# Patient Record
Sex: MALE | Race: OTHER | HISPANIC OR LATINO | URBAN - METROPOLITAN AREA
[De-identification: names, ages, dates, MRNs, and addresses within clinical notes are randomized per-mention and may not be internally consistent; named-entity substitution may affect disease eponyms.]

---

## 2023-10-03 ENCOUNTER — EMERGENCY (EMERGENCY)
Facility: HOSPITAL | Age: 38
LOS: 1 days | Discharge: HOME | End: 2023-10-03
Attending: EMERGENCY MEDICINE
Payer: MEDICAID

## 2023-10-03 VITALS
OXYGEN SATURATION: 98 % | SYSTOLIC BLOOD PRESSURE: 137 MMHG | TEMPERATURE: 98 F | RESPIRATION RATE: 16 BRPM | HEART RATE: 97 BPM | DIASTOLIC BLOOD PRESSURE: 96 MMHG | WEIGHT: 194.89 LBS | HEIGHT: 66 IN

## 2023-10-03 VITALS
DIASTOLIC BLOOD PRESSURE: 83 MMHG | OXYGEN SATURATION: 99 % | SYSTOLIC BLOOD PRESSURE: 129 MMHG | HEART RATE: 64 BPM | TEMPERATURE: 97 F | RESPIRATION RATE: 16 BRPM

## 2023-10-03 LAB
ALBUMIN SERPL ELPH-MCNC: 4 G/DL — SIGNIFICANT CHANGE UP (ref 3.5–5)
ALP SERPL-CCNC: 89 U/L — SIGNIFICANT CHANGE UP (ref 40–120)
ALT FLD-CCNC: 65 U/L DA — HIGH (ref 10–60)
ANION GAP SERPL CALC-SCNC: 6 MMOL/L — SIGNIFICANT CHANGE UP (ref 5–17)
AST SERPL-CCNC: 15 U/L — SIGNIFICANT CHANGE UP (ref 10–40)
BASOPHILS # BLD AUTO: 0.06 K/UL — SIGNIFICANT CHANGE UP (ref 0–0.2)
BASOPHILS NFR BLD AUTO: 0.7 % — SIGNIFICANT CHANGE UP (ref 0–2)
BILIRUB SERPL-MCNC: 0.2 MG/DL — SIGNIFICANT CHANGE UP (ref 0.2–1.2)
BUN SERPL-MCNC: 13 MG/DL — SIGNIFICANT CHANGE UP (ref 7–18)
CALCIUM SERPL-MCNC: 8.7 MG/DL — SIGNIFICANT CHANGE UP (ref 8.4–10.5)
CHLORIDE SERPL-SCNC: 108 MMOL/L — SIGNIFICANT CHANGE UP (ref 96–108)
CO2 SERPL-SCNC: 25 MMOL/L — SIGNIFICANT CHANGE UP (ref 22–31)
CREAT SERPL-MCNC: 0.95 MG/DL — SIGNIFICANT CHANGE UP (ref 0.5–1.3)
D DIMER BLD IA.RAPID-MCNC: 154 NG/ML DDU — SIGNIFICANT CHANGE UP
EGFR: 106 ML/MIN/1.73M2 — SIGNIFICANT CHANGE UP
EOSINOPHIL # BLD AUTO: 0.49 K/UL — SIGNIFICANT CHANGE UP (ref 0–0.5)
EOSINOPHIL NFR BLD AUTO: 5.6 % — SIGNIFICANT CHANGE UP (ref 0–6)
GLUCOSE SERPL-MCNC: 108 MG/DL — HIGH (ref 70–99)
HCT VFR BLD CALC: 43.7 % — SIGNIFICANT CHANGE UP (ref 39–50)
HGB BLD-MCNC: 14.7 G/DL — SIGNIFICANT CHANGE UP (ref 13–17)
HIV 1 & 2 AB SERPL IA.RAPID: SIGNIFICANT CHANGE UP
IMM GRANULOCYTES NFR BLD AUTO: 0.5 % — SIGNIFICANT CHANGE UP (ref 0–0.9)
LYMPHOCYTES # BLD AUTO: 3.32 K/UL — HIGH (ref 1–3.3)
LYMPHOCYTES # BLD AUTO: 37.6 % — SIGNIFICANT CHANGE UP (ref 13–44)
MCHC RBC-ENTMCNC: 28.9 PG — SIGNIFICANT CHANGE UP (ref 27–34)
MCHC RBC-ENTMCNC: 33.6 GM/DL — SIGNIFICANT CHANGE UP (ref 32–36)
MCV RBC AUTO: 85.9 FL — SIGNIFICANT CHANGE UP (ref 80–100)
MONOCYTES # BLD AUTO: 0.68 K/UL — SIGNIFICANT CHANGE UP (ref 0–0.9)
MONOCYTES NFR BLD AUTO: 7.7 % — SIGNIFICANT CHANGE UP (ref 2–14)
NEUTROPHILS # BLD AUTO: 4.23 K/UL — SIGNIFICANT CHANGE UP (ref 1.8–7.4)
NEUTROPHILS NFR BLD AUTO: 47.9 % — SIGNIFICANT CHANGE UP (ref 43–77)
NRBC # BLD: 0 /100 WBCS — SIGNIFICANT CHANGE UP (ref 0–0)
PLATELET # BLD AUTO: 280 K/UL — SIGNIFICANT CHANGE UP (ref 150–400)
POTASSIUM SERPL-MCNC: 3.8 MMOL/L — SIGNIFICANT CHANGE UP (ref 3.5–5.3)
POTASSIUM SERPL-SCNC: 3.8 MMOL/L — SIGNIFICANT CHANGE UP (ref 3.5–5.3)
PROT SERPL-MCNC: 7.7 G/DL — SIGNIFICANT CHANGE UP (ref 6–8.3)
RBC # BLD: 5.09 M/UL — SIGNIFICANT CHANGE UP (ref 4.2–5.8)
RBC # FLD: 12 % — SIGNIFICANT CHANGE UP (ref 10.3–14.5)
SODIUM SERPL-SCNC: 139 MMOL/L — SIGNIFICANT CHANGE UP (ref 135–145)
TROPONIN I, HIGH SENSITIVITY RESULT: 3.4 NG/L — SIGNIFICANT CHANGE UP
WBC # BLD: 8.82 K/UL — SIGNIFICANT CHANGE UP (ref 3.8–10.5)
WBC # FLD AUTO: 8.82 K/UL — SIGNIFICANT CHANGE UP (ref 3.8–10.5)

## 2023-10-03 PROCEDURE — 93005 ELECTROCARDIOGRAM TRACING: CPT

## 2023-10-03 PROCEDURE — 86703 HIV-1/HIV-2 1 RESULT ANTBDY: CPT

## 2023-10-03 PROCEDURE — 84484 ASSAY OF TROPONIN QUANT: CPT

## 2023-10-03 PROCEDURE — 71046 X-RAY EXAM CHEST 2 VIEWS: CPT

## 2023-10-03 PROCEDURE — 85025 COMPLETE CBC W/AUTO DIFF WBC: CPT

## 2023-10-03 PROCEDURE — 96374 THER/PROPH/DIAG INJ IV PUSH: CPT

## 2023-10-03 PROCEDURE — 71046 X-RAY EXAM CHEST 2 VIEWS: CPT | Mod: 26

## 2023-10-03 PROCEDURE — 99285 EMERGENCY DEPT VISIT HI MDM: CPT | Mod: 25

## 2023-10-03 PROCEDURE — 99285 EMERGENCY DEPT VISIT HI MDM: CPT

## 2023-10-03 PROCEDURE — 36415 COLL VENOUS BLD VENIPUNCTURE: CPT

## 2023-10-03 PROCEDURE — 85379 FIBRIN DEGRADATION QUANT: CPT

## 2023-10-03 PROCEDURE — 80053 COMPREHEN METABOLIC PANEL: CPT

## 2023-10-03 RX ORDER — KETOROLAC TROMETHAMINE 30 MG/ML
15 SYRINGE (ML) INJECTION ONCE
Refills: 0 | Status: DISCONTINUED | OUTPATIENT
Start: 2023-10-03 | End: 2023-10-03

## 2023-10-03 RX ADMIN — Medication 15 MILLIGRAM(S): at 06:11

## 2023-10-03 RX ADMIN — Medication 15 MILLIGRAM(S): at 05:23

## 2023-10-03 NOTE — ED PROVIDER NOTE - NSFOLLOWUPINSTRUCTIONS_ED_ALL_ED_FT
Para el dolor tome tylenol 650 mg o ibuprofeno 600 mg cada 6 horas.  Seguimiento con PMD para reevaluación.  Regrese al servicio de urgencias si eddy síntomas empeoran: fiebre toribio, dolor de pecho intenso o dificultad para respirar.      For pain take tylenol 650mg or ibuprofen 600mg every 6 hours.  Followup with PMD for reevaluation.  Return to ED if you develop worsening symptoms-high fevers, severe chest pain or difficulty breathing.

## 2023-10-03 NOTE — ED ADULT NURSE NOTE - NSFALLUNIVINTERV_ED_ALL_ED
Bed/Stretcher in lowest position, wheels locked, appropriate side rails in place/Call bell, personal items and telephone in reach/Instruct patient to call for assistance before getting out of bed/chair/stretcher/Non-slip footwear applied when patient is off stretcher/[place] to call system/Physically safe environment - no spills, clutter or unnecessary equipment/Purposeful proactive rounding/Room/bathroom lighting operational, light cord in reach

## 2023-10-03 NOTE — ED PROVIDER NOTE - PATIENT PORTAL LINK FT
You can access the FollowMyHealth Patient Portal offered by [facility] by registering at the following website: [email or web address]. By joining [facility]’s FollowMyHealth portal, you will also be able to view your health information using other applications (apps) compatible with our system.

## 2023-10-03 NOTE — ED PROVIDER NOTE - CLINICAL SUMMARY MEDICAL DECISION MAKING FREE TEXT BOX
38-year-old male with no past medical history presents with 3 days of chest pain.   ekg interpretation- NSR  lab interpretation- wbc wnl, trop wnl, ddimer wnl  imaging interpretation- CXR shows no focal infiltrate  Discussed above with patient/family. On reeval patient well-appearing, stable for discharge.

## 2023-10-03 NOTE — ED PROVIDER NOTE - OBJECTIVE STATEMENT
38-year-old male with no past medical history presents with 3 days of chest pain.  Reports onset 2 days ago over the right side of his chest under his armpit.  Reports pain is exacerbated by deep inspiration movement and palpation to the area.  Reports he took aspirin 1 day ago with mild improvement of the pain.  Denies any fevers, cough, shortness of breath, leg swelling/pain, recent travel or recent surgeries.  Denies similar symptoms in the past.  Reports occasional alcohol use, is a former smoker and denies any drug/cocaine use.  Evaluation performed in Congolese language by me.

## 2024-04-19 ENCOUNTER — EMERGENCY (EMERGENCY)
Facility: HOSPITAL | Age: 39
LOS: 1 days | Discharge: HOME | End: 2024-04-19
Attending: STUDENT IN AN ORGANIZED HEALTH CARE EDUCATION/TRAINING PROGRAM
Payer: COMMERCIAL

## 2024-04-19 VITALS
WEIGHT: 181.88 LBS | SYSTOLIC BLOOD PRESSURE: 117 MMHG | HEART RATE: 68 BPM | HEIGHT: 66.54 IN | RESPIRATION RATE: 18 BRPM | DIASTOLIC BLOOD PRESSURE: 85 MMHG | OXYGEN SATURATION: 98 % | TEMPERATURE: 98 F

## 2024-04-19 PROCEDURE — 99283 EMERGENCY DEPT VISIT LOW MDM: CPT

## 2024-04-19 PROCEDURE — 99282 EMERGENCY DEPT VISIT SF MDM: CPT

## 2024-04-19 NOTE — ED PROVIDER NOTE - PHYSICAL EXAMINATION
Constitutional: Well-appearing, well-nourished, comfortable appearing.   Head: Normocephalic, atraumatic.   Eyes: PERRL. EOMI. No conjunctival pallor.   ENT: Moist mucous membranes. Uvula midline. No pharyngeal erythema or exudates.  Neck: No LAD. Supple.  CVS: Regular rate, regular rhythm. Normal S1, S2. No murmurs, rubs, or gallops. No peripheral edema noted.   Respiratory: No respiratory distress. Clear to auscultation bilaterally. No wheezing, rales, or rhonchi.   Abdomen: Abd is soft and non-distended. No tenderness. No rebound, guarding, or rigidity.   MSK: No CVA tenderness bilaterally.   Neuro: Alert and oriented to person, place, and time. Follows commands. Moves all extremities. Normal finger to nose. Normal gait. CN II-XII intact. Strenght is 5/5 and symmetric bilaterally. Sensation grossly intact.   Skin: Warm and dry. No rashes.   Psych: Normal affect, cooperative.

## 2024-04-19 NOTE — ED PROVIDER NOTE - CLINICAL SUMMARY MEDICAL DECISION MAKING FREE TEXT BOX
38-year-old male with no significant past medical history presenting to the ED for evaluation of elevated blood pressure.  As per patient, he states that he has been experiencing intermittent headaches over the past week and has been treating it with Excedrin at home.  Patient takes he takes 1-2 doses a day with temporary relief.  Patient states that he ended up going to the pharmacy to buy a blood pressure monitor and had his blood pressure taken and was elevated in the 140s and was advised to go to an urgent care for further evaluation.  At the urgent care, the patient was evaluated to the ED for further evaluation.  Patient has no headache at this time.  He denies any chest pain, shortness of breath, cough, fever, chills, nausea, vomiting, diarrhea, abdominal pain, dysuria, hematuria, flank pain, changes in vision, changes in speech, numbness, weakness, and any additional complaints at this time.      In the ED, the patient is well-appearing with no focal neurologic deficits.  Normal gait.  Cranial nerves all intact.  Cerebellar functions intact.  Patient is afebrile, not tachycardic, normotensive, and satting 98% on room air.  I advised the patient to establish primary care and to initiate a blood pressure diary over the next 2 weeks until he is able to see her primary care physician.  He reports increased stress and describes headache in frontal band like distribution, likely tension headache as no light/sound sensitivity, nausea/vomiting. Patient agreeable with plan.  In terms of his headaches, I advised the patient that he can start ibuprofen every 4-6 hours as needed for pain and if continued pain he can take Acetaminophen 650 mg interchangeable every several hours. He agrees with plan. Pt is stable for discharge.

## 2024-04-19 NOTE — ED PROVIDER NOTE - NSFOLLOWUPINSTRUCTIONS_ED_ALL_ED_FT
jenn juan miguel stefan con dupree doctor primario.     Comienze diario para la presion diario hasta que indra el doctor(a) primario.     Vuelva si eddy sintomas empeoran.     Cómo tomarse la presión arterial  How to Take Your Blood Pressure  La presión arterial es la medida de la fuerza de la marjorie al presionar contra las calvo de las arterias. Las arterias son los vasos sanguíneos que transportan la marjorie desde el corazón hacia todas las partes del cuerpo. Dupree médico sammi dupree presión arterial en cada visita al consultorio. Usted también puede tomarse la presión arterial en casa con un tensiómetro.    Es posible que deba tomarse la presión arterial:  Para confirmar un diagnóstico de presión arterial elevada (hipertensión).  Para vigilar dupree presión arterial a lo alysia del tiempo.  Para asegurarse de que el medicamento que sammi para la presión arterial esté surtiendo efecto.  Materiales necesarios:  Monitor para medir la presión arterial.  Juan Miguel silla para sentarse. Debe ser juan miguel silla en la que pueda sentarse erguido con la espalda apoyada. No se siente en un sillón blando o sofá.  [place] o escritorio.  Cuaderno pequeño y lápiz o bolígrafo.  Cómo prepararse  Para obtener la lectura más precisa, evite realizar lo siguiente neo los 30 minutos previos a controlar dupree presión arterial:  Beber cafeína.  Consumir alcohol.  [place].  Fumar.  Realizar actividad física.  Robert minutos antes de controlar dupree presión arterial:  Vaya al baño y orine para tener la vejiga vacía.  Siéntese tranquilamente en juan miguel silla. No hable.  Cómo tomarse la presión arterial  A person checking his blood pressure with a monitor and cuff.  Para controlar dupree presión arterial, siga las instrucciones presentes en el manual que se incluye con el tensiómetro. Si tiene un tensiómetro digital, las instrucciones podrían ser las siguientes:  Siéntese derecho en juan miguel silla.  Coloque los pies en el piso. No cruce los tobillos ni las piernas.  Apoye dupree brazo negra al nivel de dupree corazón en juan miguel conley o escritorio, o en el brazo de la silla.  [place]ng[place].  Envuelva la parte superior de dupree brazo negra, 1 pulgada (2.5 cm) sobre dupree codo, con el brazalete. Es mejor envolver el brazalete alrededor de la piel desnuda.  Ajuste el brazalete ceñidamente, clementina no demasiado apretado, alrededor del brazo. Debe poder meter únicamente un dedo entre el brazalete y el brazo.  Coloque el cordón de modo que quede apoyado en el pliegue del codo.  Presione el botón de encendido.  Permanezca sentado tranquilamente mientras el brazalete se infla y se desinfla.  Negar la lectura digital que aparece en la pantalla del tensiómetro y anote los números (regístrelos) en un cuaderno.  Espere de 2 a 3 minutos, y luego repita los pasos desde el paso 1.  ¿Qué significa mi lectura de presión arterial?  Juan Miguel lectura de la presión arterial consta de un número más alto sobre un número más bajo. En condiciones ideales, la presión arterial debe estar por debajo de 120/80. El primer número (“superior”) es la presión sistólica. Es la medida de la presión de las arterias cuando el corazón late. El geeta número (“inferior”) es la presión diastólica. Es la medida de la presión en las arterias cuando el corazón se relaja.    La presión arterial se clasifica en cuatro etapas. Las siguientes son las etapas para adultos que no tienen enfermedad grave de corto plazo o juan miguel afección crónica. La presión sistólica y la presión diastólica se miden en juan miguel unidad llamada mm Hg (milímetros de bang).    Normal    Presión sistólica: por debajo de 120.  Presión diastólica: por debajo de 80.  Elevada    Presión sistólica: 120–129.  Presión diastólica: por debajo de 80.  Etapa 1 de hipertensión    Presión sistólica: 130–139.  Presión diastólica: 80–89.  Etapa 2 de hipertensión    Presión sistólica: 140 o más.  Presión diastólica: 90 o más.  Puede tener presión arterial elevada o hipertensión incluso si únicamente el número sistólico o el diastólico de dupree lectura es más elevado de lo normal.    Siga estas indicaciones en dupree casa:  Medicamentos    Use los medicamentos de venta parish y los recetados solamente [place] se lo haya indicado el médico.  Dígale a dupree médico si tiene efectos secundarios causados por los medicamentos para la presión arterial.  Indicaciones generales    Mida dupree presión arterial con la frecuencia recomendada por dupree médico.  Contrólese la presión arterial a la misma hora todos los días.  Lleve el tensiómetro a dupree próxima stefan con el médico para asegurarse de lo siguiente:  Que lo usa correctamente.  Que genera lecturas precisas.  Asegúrese de entender cuáles son eddy números objetivo para la presión arterial.  Concurra a todas las visitas de seguimiento. [place] es importante.  Consejos generales    Dupree médico puede sugerirle un tensiómetro confiable que cumpla con eddy necesidades. Existen varios tipos de tensiómetros para uso en el hogar.  Escoja un tensiómetro que tenga un brazalete. No escoja un tensiómetro que mida dupree presión arterial en la elmer o el dedo.  Elija un brazalete que le envuelva ceñidamente, clementina no ajuste demasiado ni quede muy flojo, la parte superior del brazo. Debe poder meter únicamente un dedo entre el brazalete y el brazo.  Puede comprar un tensiómetro en la mayoría de las farmacias o en línea.  Dónde obtener más información  American Heart Association ([employer] [place]): www.heart.org    Comuníquese con un médico si:  Dupree presión arterial es sistemáticamente toribio.  Dupree presión arterial disminuye repentinamente.  Solicite ayuda de inmediato si:  Dupree presión arterial sistólica está por encima de 180.  Dupree presión arterial diastólica está por encima de 120.  Estos síntomas pueden indicar juan miguel emergencia. Solicite ayuda de inmediato. Llame al 911.  No espere a sabi si los síntomas desaparecen.  No conduzca por eddy propios medios hasta el hospital.  Resumen  La presión arterial es la medida de la fuerza de la marjorie al presionar contra las calvo de las arterias.  Juan Miguel lectura de la presión arterial consta de un número más alto sobre un número más bajo. En condiciones ideales, la presión arterial debe estar por debajo de 120/80.  Contrólese la presión arterial a la misma hora todos los días.  Evite la cafeína, el alcohol, fumar y hacer actividad física neo los 30 minutos anteriores a controlarse la presión arterial. Estos factores pueden afectar la precisión de la lectura de la presión arterial.  Esta información no tiene [place] fin reemplazar el consejo del médico. Asegúrese de hacerle al médico cualquier pregunta que tenga.

## 2024-04-19 NOTE — ED ADULT TRIAGE NOTE - CHIEF COMPLAINT QUOTE
C/o headaches x several days and the pharmacist told me to go to the ER as my BP was high. Excedrin taken 2 hours ago with relief

## 2024-04-19 NOTE — ED PROVIDER NOTE - PATIENT PORTAL LINK FT
You can access the FollowMyHealth Patient Portal offered by [facility] by registering at the following website: [email or web address]. By joining [facility]’s FollowMyHealth portal, you will also be able to view your health information using other applications (apps) compatible with our system.